# Patient Record
Sex: MALE | Race: WHITE | NOT HISPANIC OR LATINO | URBAN - METROPOLITAN AREA
[De-identification: names, ages, dates, MRNs, and addresses within clinical notes are randomized per-mention and may not be internally consistent; named-entity substitution may affect disease eponyms.]

---

## 2018-05-06 ENCOUNTER — EMERGENCY (EMERGENCY)
Facility: HOSPITAL | Age: 73
LOS: 1 days | Discharge: HOME | End: 2018-05-06
Attending: EMERGENCY MEDICINE
Payer: MEDICARE

## 2018-05-06 VITALS
DIASTOLIC BLOOD PRESSURE: 87 MMHG | HEART RATE: 82 BPM | RESPIRATION RATE: 18 BRPM | TEMPERATURE: 98 F | SYSTOLIC BLOOD PRESSURE: 138 MMHG | OXYGEN SATURATION: 96 %

## 2018-05-06 VITALS — WEIGHT: 160.06 LBS

## 2018-05-06 PROCEDURE — 99284 EMERGENCY DEPT VISIT MOD MDM: CPT | Mod: 25

## 2018-05-06 PROCEDURE — 12002 RPR S/N/AX/GEN/TRNK2.6-7.5CM: CPT | Mod: 59

## 2018-05-06 PROCEDURE — 26770 TREAT FINGER DISLOCATION: CPT | Mod: F6,F7

## 2018-05-06 PROCEDURE — 90715 TDAP VACCINE 7 YRS/> IM: CPT

## 2018-05-06 PROCEDURE — 73130 X-RAY EXAM OF HAND: CPT

## 2018-05-06 PROCEDURE — 90471 IMMUNIZATION ADMIN: CPT

## 2018-05-06 PROCEDURE — 73130 X-RAY EXAM OF HAND: CPT | Mod: 26,76,RT

## 2018-05-06 PROCEDURE — 12002 RPR S/N/AX/GEN/TRNK2.6-7.5CM: CPT | Mod: XU

## 2018-05-06 PROCEDURE — 26770 TREAT FINGER DISLOCATION: CPT | Mod: 54

## 2018-05-06 RX ORDER — TETANUS TOXOID, REDUCED DIPHTHERIA TOXOID AND ACELLULAR PERTUSSIS VACCINE, ADSORBED 5; 2.5; 8; 8; 2.5 [IU]/.5ML; [IU]/.5ML; UG/.5ML; UG/.5ML; UG/.5ML
0.5 SUSPENSION INTRAMUSCULAR ONCE
Qty: 0 | Refills: 0 | Status: COMPLETED | OUTPATIENT
Start: 2018-05-06 | End: 2018-05-06

## 2018-05-06 RX ORDER — IBUPROFEN 200 MG
1 TABLET ORAL
Qty: 30 | Refills: 0 | OUTPATIENT
Start: 2018-05-06 | End: 2018-05-10

## 2018-05-06 RX ORDER — CEPHALEXIN 500 MG
1 CAPSULE ORAL
Qty: 28 | Refills: 0 | OUTPATIENT
Start: 2018-05-06 | End: 2018-05-12

## 2018-05-06 RX ORDER — CEPHALEXIN 500 MG
500 CAPSULE ORAL ONCE
Qty: 0 | Refills: 0 | Status: COMPLETED | OUTPATIENT
Start: 2018-05-06 | End: 2018-05-06

## 2018-05-06 RX ADMIN — Medication 500 MILLIGRAM(S): at 15:21

## 2018-05-06 RX ADMIN — TETANUS TOXOID, REDUCED DIPHTHERIA TOXOID AND ACELLULAR PERTUSSIS VACCINE, ADSORBED 0.5 MILLILITER(S): 5; 2.5; 8; 8; 2.5 SUSPENSION INTRAMUSCULAR at 15:21

## 2018-05-06 NOTE — ED ADULT NURSE NOTE - OBJECTIVE STATEMENT
Patient states that he was using a table saw and got his glove stuck in the saw, patient states that his hand got caught slightly and he cut the tips of his right ring and middle finger off. Patient has deformities noted to his pointer and middle finger. Bleeding is controlled. Avulsions noted to tips of middle and ring finger.

## 2018-05-06 NOTE — ED PROCEDURE NOTE - PROCEDURE ADDITIONAL DETAILS
2nd finger- laceration over pip palmar aspect approx 2cm  3rd finger- laceration to distal tuft approx 1- 1.5cm  4th finger- laceration to medial aspect  of pip approx 1.5cm

## 2018-05-06 NOTE — ED PROVIDER NOTE - ATTENDING CONTRIBUTION TO CARE
MD/PA VISIT DISLOCATION R HAND 2 AND 3 PIP AND LACS R 234  REDUCED AND SUTURED AGREE WITH HX PE TX DISPO

## 2018-05-06 NOTE — ED PROVIDER NOTE - SKIN WOUND DESCRIPTION
Lacerations to right 2,3,4th digits. Abrasion to 5th distal tip. Laceration to 2nd digit -2cm vertical over pip- palmar aspect. flexor tendon visualized and intact. 3rd digit + laceration to distal tip approx 1cm.  4th digit + laceration to medial aspect of pip approx 1.5cm. No tendon invol.

## 2018-05-06 NOTE — ED PROVIDER NOTE - OBJECTIVE STATEMENT
72 yo M presented to ED s/p finger/hand injury. Pt states that he was wearing gloves and  cutting wood with a table saw and glove got caught in saw turning his fingers. dT unknown.  + pain + dec ROM + bleeding Denies paresthesias.

## 2021-10-05 NOTE — ED ADULT NURSE NOTE - DISCHARGE TEACHING
Pt called in with concerns for thrush. He has had this multiple times and is requesting treatment. Spoke with Dr Geeta Guadalupe. Reviewed last CD4 and upcoming apt is scheduled. Nystatin Swish and Swallow 4 times daily x 10 days       Spoke with pt. reviewed order. Reviewed upcoming apt. Lab transport scheduled for 10.15.21 and V/V 10.26.21    Discussed recent tyroid issues. He follows with ENT and PCP regarding this. States this is being monitored at the time and no procedures.      Nystatin called into [facility]
d/c by PA